# Patient Record
Sex: MALE | Race: WHITE | NOT HISPANIC OR LATINO | URBAN - METROPOLITAN AREA
[De-identification: names, ages, dates, MRNs, and addresses within clinical notes are randomized per-mention and may not be internally consistent; named-entity substitution may affect disease eponyms.]

---

## 2023-10-30 ENCOUNTER — LAB (OUTPATIENT)
Dept: LAB | Facility: LAB | Age: 43
End: 2023-10-30
Payer: COMMERCIAL

## 2023-10-30 DIAGNOSIS — J06.9 ACUTE UPPER RESPIRATORY INFECTION, UNSPECIFIED: Primary | ICD-10-CM

## 2023-10-30 DIAGNOSIS — J06.9 ACUTE UPPER RESPIRATORY INFECTION, UNSPECIFIED: ICD-10-CM

## 2023-10-30 LAB
ALBUMIN SERPL BCP-MCNC: 4.1 G/DL (ref 3.4–5)
ANION GAP SERPL CALC-SCNC: 10 MMOL/L (ref 10–20)
BASOPHILS # BLD AUTO: 0.02 X10*3/UL (ref 0–0.1)
BASOPHILS NFR BLD AUTO: 0.3 %
BUN SERPL-MCNC: 19 MG/DL (ref 6–23)
CALCIUM SERPL-MCNC: 8.9 MG/DL (ref 8.6–10.3)
CHLORIDE SERPL-SCNC: 103 MMOL/L (ref 98–107)
CO2 SERPL-SCNC: 28 MMOL/L (ref 21–32)
CREAT SERPL-MCNC: 0.98 MG/DL (ref 0.5–1.3)
EOSINOPHIL # BLD AUTO: 0.03 X10*3/UL (ref 0–0.7)
EOSINOPHIL NFR BLD AUTO: 0.4 %
ERYTHROCYTE [DISTWIDTH] IN BLOOD BY AUTOMATED COUNT: 12.8 % (ref 11.5–14.5)
GFR SERPL CREATININE-BSD FRML MDRD: >90 ML/MIN/1.73M*2
GLUCOSE SERPL-MCNC: 83 MG/DL (ref 74–99)
HCT VFR BLD AUTO: 46.4 % (ref 41–52)
HGB BLD-MCNC: 15.8 G/DL (ref 13.5–17.5)
IMM GRANULOCYTES # BLD AUTO: 0.02 X10*3/UL (ref 0–0.7)
IMM GRANULOCYTES NFR BLD AUTO: 0.3 % (ref 0–0.9)
LYMPHOCYTES # BLD AUTO: 1.6 X10*3/UL (ref 1.2–4.8)
LYMPHOCYTES NFR BLD AUTO: 21.8 %
MCH RBC QN AUTO: 30.3 PG (ref 26–34)
MCHC RBC AUTO-ENTMCNC: 34.1 G/DL (ref 32–36)
MCV RBC AUTO: 89 FL (ref 80–100)
MONOCYTES # BLD AUTO: 0.45 X10*3/UL (ref 0.1–1)
MONOCYTES NFR BLD AUTO: 6.1 %
NEUTROPHILS # BLD AUTO: 5.22 X10*3/UL (ref 1.2–7.7)
NEUTROPHILS NFR BLD AUTO: 71.1 %
NRBC BLD-RTO: 0 /100 WBCS (ref 0–0)
PHOSPHATE SERPL-MCNC: 3.4 MG/DL (ref 2.5–4.9)
PLATELET # BLD AUTO: 233 X10*3/UL (ref 150–450)
PMV BLD AUTO: 10.5 FL (ref 7.5–11.5)
POTASSIUM SERPL-SCNC: 4.2 MMOL/L (ref 3.5–5.3)
RBC # BLD AUTO: 5.22 X10*6/UL (ref 4.5–5.9)
SODIUM SERPL-SCNC: 137 MMOL/L (ref 136–145)
WBC # BLD AUTO: 7.3 X10*3/UL (ref 4.4–11.3)

## 2023-10-30 PROCEDURE — 82784 ASSAY IGA/IGD/IGG/IGM EACH: CPT

## 2023-10-30 PROCEDURE — 80069 RENAL FUNCTION PANEL: CPT

## 2023-10-30 PROCEDURE — 36415 COLL VENOUS BLD VENIPUNCTURE: CPT

## 2023-10-30 PROCEDURE — 82785 ASSAY OF IGE: CPT

## 2023-10-30 PROCEDURE — 86038 ANTINUCLEAR ANTIBODIES: CPT

## 2023-10-30 PROCEDURE — 86036 ANCA SCREEN EACH ANTIBODY: CPT

## 2023-10-30 PROCEDURE — 83516 IMMUNOASSAY NONANTIBODY: CPT

## 2023-10-30 PROCEDURE — 86317 IMMUNOASSAY INFECTIOUS AGENT: CPT

## 2023-10-30 PROCEDURE — 85025 COMPLETE CBC W/AUTO DIFF WBC: CPT

## 2023-10-31 PROBLEM — Z52.4 DONOR OF KIDNEY FOR TRANSPLANT: Status: ACTIVE | Noted: 2023-10-31

## 2023-10-31 PROBLEM — M00.9: Status: ACTIVE | Noted: 2018-05-01

## 2023-10-31 PROBLEM — L03.012 CELLULITIS OF FINGER, LEFT: Status: ACTIVE | Noted: 2018-04-30

## 2023-10-31 PROBLEM — G93.32 CHRONIC FATIGUE SYNDROME: Status: ACTIVE | Noted: 2021-07-19

## 2023-10-31 PROBLEM — F98.8 ATTENTION DEFICIT DISORDER: Status: ACTIVE | Noted: 2021-07-19

## 2023-10-31 PROBLEM — G47.31 PRIMARY CENTRAL SLEEP APNEA: Status: ACTIVE | Noted: 2021-07-19

## 2023-10-31 PROBLEM — H66.91 RIGHT OTITIS MEDIA: Status: ACTIVE | Noted: 2023-10-31

## 2023-10-31 PROBLEM — J31.0 CHRONIC RHINITIS: Status: ACTIVE | Noted: 2023-10-31

## 2023-10-31 PROBLEM — J32.0 CHRONIC MAXILLARY SINUSITIS: Status: ACTIVE | Noted: 2023-10-31

## 2023-10-31 PROBLEM — M65.9 TENOSYNOVITIS OF FINGER: Status: ACTIVE | Noted: 2018-05-01

## 2023-10-31 PROBLEM — A49.01 MSSA (METHICILLIN SUSCEPTIBLE STAPHYLOCOCCUS AUREUS) INFECTION: Status: ACTIVE | Noted: 2018-04-30

## 2023-10-31 PROBLEM — G89.29 CHRONIC PAIN: Status: ACTIVE | Noted: 2021-07-19

## 2023-10-31 LAB
ANA SER QL HEP2 SUBST: NEGATIVE
IGA SERPL-MCNC: 130 MG/DL (ref 70–400)
IGE SERPL-ACNC: 37 IU/ML (ref 0–214)
IGM SERPL-MCNC: 36 MG/DL (ref 40–230)

## 2023-10-31 RX ORDER — TRAZODONE HYDROCHLORIDE 100 MG/1
100 TABLET ORAL NIGHTLY
COMMUNITY
Start: 2023-07-21

## 2023-10-31 RX ORDER — AZELASTINE 1 MG/ML
2 SPRAY, METERED NASAL 2 TIMES DAILY
COMMUNITY
Start: 2023-10-05

## 2023-10-31 RX ORDER — LORATADINE 10 MG/1
10 TABLET ORAL DAILY
COMMUNITY
Start: 2017-02-09

## 2023-10-31 RX ORDER — FLUTICASONE PROPIONATE 50 MCG
2 SPRAY, SUSPENSION (ML) NASAL DAILY
COMMUNITY

## 2023-10-31 RX ORDER — DEXTROAMPHETAMINE SACCHARATE, AMPHETAMINE ASPARTATE MONOHYDRATE, DEXTROAMPHETAMINE SULFATE AND AMPHETAMINE SULFATE 5; 5; 5; 5 MG/1; MG/1; MG/1; MG/1
20 CAPSULE, EXTENDED RELEASE ORAL EVERY MORNING
COMMUNITY
Start: 2016-03-28

## 2023-10-31 RX ORDER — OXAPROZIN 600 MG/1
600 TABLET, FILM COATED ORAL DAILY
COMMUNITY
Start: 2023-10-22

## 2023-10-31 RX ORDER — SOD CHLOR,BICARB/SQUEEZ BOTTLE
PACKET, WITH RINSE DEVICE NASAL
COMMUNITY
Start: 2023-09-05

## 2023-10-31 RX ORDER — GABAPENTIN 400 MG/1
2 CAPSULE ORAL 4 TIMES DAILY
COMMUNITY

## 2023-10-31 RX ORDER — BUPRENORPHINE AND NALOXONE 8; 2 MG/1; MG/1
1 FILM, SOLUBLE BUCCAL; SUBLINGUAL DAILY
COMMUNITY

## 2023-10-31 RX ORDER — DULOXETIN HYDROCHLORIDE 20 MG/1
20 CAPSULE, DELAYED RELEASE ORAL 2 TIMES DAILY
COMMUNITY
Start: 2023-07-31 | End: 2023-10-29 | Stop reason: WASHOUT

## 2023-11-04 LAB
ANCA AB PATTERN SER IF-IMP: NORMAL
ANCA IGG TITR SER IF: NORMAL {TITER}
MYELOPEROXIDASE AB SER-ACNC: 0 AU/ML (ref 0–19)
PROTEINASE3 AB SER-ACNC: 0 AU/ML (ref 0–19)
S PN DA SERO 19F IGG SER-MCNC: 6.05 UG/ML
S PNEUM DA 1 IGG SER-MCNC: 1.77 UG/ML
S PNEUM DA 12F IGG SER-MCNC: 0.49 UG/ML
S PNEUM DA 14 IGG SER-MCNC: 0.16 UG/ML
S PNEUM DA 18C IGG SER-MCNC: 0.68 UG/ML
S PNEUM DA 23F IGG SER-MCNC: 0.28 UG/ML
S PNEUM DA 3 IGG SER-MCNC: 1.34 UG/ML
S PNEUM DA 4 IGG SER-MCNC: 1.25 UG/ML
S PNEUM DA 5 IGG SER-MCNC: 0.15 UG/ML
S PNEUM DA 6B IGG SER-MCNC: 0.78 UG/ML
S PNEUM DA 7F IGG SER-MCNC: 1.94 UG/ML
S PNEUM DA 8 IGG SER-MCNC: 0.77 UG/ML
S PNEUM DA 9N IGG SER-MCNC: 0.8 UG/ML
S PNEUM DA 9V IGG SER-MCNC: 1.97 UG/ML
S PNEUM SEROTYPE IGG SER-IMP: NORMAL

## 2023-11-07 ENCOUNTER — OFFICE VISIT (OUTPATIENT)
Dept: OTOLARYNGOLOGY | Facility: CLINIC | Age: 43
End: 2023-11-07
Payer: COMMERCIAL

## 2023-11-07 VITALS
TEMPERATURE: 97.2 F | SYSTOLIC BLOOD PRESSURE: 107 MMHG | RESPIRATION RATE: 16 BRPM | HEART RATE: 82 BPM | WEIGHT: 185 LBS | HEIGHT: 72 IN | DIASTOLIC BLOOD PRESSURE: 71 MMHG | BODY MASS INDEX: 25.06 KG/M2

## 2023-11-07 DIAGNOSIS — J34.89 NASAL DRAINAGE: ICD-10-CM

## 2023-11-07 DIAGNOSIS — J34.2 DEVIATED NASAL SEPTUM: ICD-10-CM

## 2023-11-07 DIAGNOSIS — J34.3 HYPERTROPHY OF BOTH INFERIOR NASAL TURBINATES: ICD-10-CM

## 2023-11-07 DIAGNOSIS — J31.0 CHRONIC RHINITIS: ICD-10-CM

## 2023-11-07 DIAGNOSIS — R76.8 LOW SERUM IGM FOR AGE: Primary | ICD-10-CM

## 2023-11-07 DIAGNOSIS — J34.89 NASAL OBSTRUCTION: ICD-10-CM

## 2023-11-07 DIAGNOSIS — R09.81 NASAL CONGESTION: ICD-10-CM

## 2023-11-07 DIAGNOSIS — J06.9 RECURRENT UPPER RESPIRATORY TRACT INFECTION: ICD-10-CM

## 2023-11-07 PROBLEM — M20.029 BOUTONNIERE DEFORMITY: Status: ACTIVE | Noted: 2018-08-10

## 2023-11-07 PROBLEM — M25.519 SHOULDER PAIN: Status: ACTIVE | Noted: 2020-01-02

## 2023-11-07 PROBLEM — Z86.59 HISTORY OF MENTAL DISORDER: Status: ACTIVE | Noted: 2023-11-07

## 2023-11-07 PROBLEM — M75.100 TEAR OF ROTATOR CUFF: Status: ACTIVE | Noted: 2023-11-07

## 2023-11-07 PROBLEM — M25.569 KNEE PAIN: Status: ACTIVE | Noted: 2023-11-07

## 2023-11-07 PROBLEM — S63.639A SPRAIN OF INTERPHALANGEAL JOINT OF FINGER: Status: ACTIVE | Noted: 2020-01-10

## 2023-11-07 PROBLEM — R22.30 LUMP ON FINGER: Status: ACTIVE | Noted: 2018-04-24

## 2023-11-07 PROCEDURE — 99213 OFFICE O/P EST LOW 20 MIN: CPT | Performed by: STUDENT IN AN ORGANIZED HEALTH CARE EDUCATION/TRAINING PROGRAM

## 2023-11-07 PROCEDURE — 1036F TOBACCO NON-USER: CPT | Performed by: STUDENT IN AN ORGANIZED HEALTH CARE EDUCATION/TRAINING PROGRAM

## 2023-11-07 NOTE — PROGRESS NOTES
Assessment  Recurrent URIs  Chronic rhinitis  Nasal congestion  Nasal drainage  Bilateral inferior turbinate hypertrophy  History of dust mite allergy     Plan   42-year-old male presenting for evaluation of recurrent URIs and associated sinonasal symptomatology.  Lab work notable for low IgM.  He was referred to allergy immunology for further evaluation.   Reports his sinonasal symptomatology is well controlled.  He will continue his current nasal hygiene/ medical regimen consisting of Flonase, azelastine (PRN), saline irrigations.    The patient was instructed on the correct technique to administer the topical nasal spray aiming at the lateral nasal wall for maximal efficacy and to avoid irritation to the septum which could lead to epistaxis. I stressed consistency of usage for maximal benefit. We discussed the rationale for the timing of this therapy and the benefits and potential adverse effects.   RTC 6m       History of Present Illness  11/7/23  The patient present for follow-up, reports improvement of his sinonasal symptomatology.  Currently has no complaints.  Lab results reviewed and discussed with patient notable for low IgM.     Recall   42-year-old male presenting for initial evaluation of multiple ENT complaints.     The patient reports developing recurrent episodes of URI/throat infections. Episodes occur once or twice a month, symptoms include throat discomfort, increased nasal drainage/congestion, occasional coughing.   Episodes usually resolve without antibiotic treatment.   Also endorses multiple sinonasal issues, usually worsened by URIs  Main sinonasal symptoms include bilateral nasal congestion, anterior and posterior nasal drainage.  Has been evaluated by allergy immunology in the past and found to have dust mite allergies.     Denies reflux symptomatology  Patient denies headaches, facial pain, facial pressure, ansomia, dental pain, mmunotherapy.   No  odynophagia/dysphagia/SOB/dyspnea/hoarseness/fevers/chills/weight loss/night sweats.     Current treatment:  Nasal Steroid: No  Sinus Rinse: Yes  Antihistamine: No  Prednisone: No  Other: None     Recent CT: None   Previous nasal/sinus surgery: None  Past Medical History:   Diagnosis Date    Other psychoactive substance dependence, in remission (CMS/HCC)     Drug addiction in remission    Pain in unspecified knee     Chronic knee pain    Personal history of other mental and behavioral disorders     History of attention deficit disorder    Unspecified rotator cuff tear or rupture of right shoulder, not specified as traumatic     Right rotator cuff tear       Past Surgical History:   Procedure Laterality Date    OTHER SURGICAL HISTORY  12/28/2016    History Of Prior Surgery         Current Outpatient Medications on File Prior to Visit   Medication Sig Dispense Refill    amphetamine-dextroamphetamine XR (Adderall XR) 20 mg 24 hr capsule Take 1 capsule (20 mg) by mouth once daily in the morning.      azelastine (Astelin) 137 mcg (0.1 %) nasal spray Administer 2 sprays into each nostril 2 times a day.      buprenorphine-naloxone (Suboxone) 8-2 mg SL film Place 1 Film under the tongue once daily.      fluticasone (Flonase) 50 mcg/actuation nasal spray Administer 2 sprays into each nostril once daily.      gabapentin (Neurontin) 400 mg capsule Take 2 capsules (800 mg) by mouth 4 times a day.      loratadine (Claritin) 10 mg tablet Take 1 tablet (10 mg) by mouth once daily.      Nasal Wash nasal rinse Administer into affected nostril(s).      oxaprozin (Daypro) 600 mg tablet Take 1 tablet (600 mg) by mouth once daily. Take with food.      traZODone (Desyrel) 100 mg tablet Take 1 tablet (100 mg) by mouth once daily at bedtime.      [DISCONTINUED] DULoxetine (Cymbalta) 20 mg DR capsule Take 1 capsule (20 mg) by mouth 2 times a day.       No current facility-administered medications on file prior to visit.        Review of  Systems  A detailed 12 point ROS was performed and is negative except as noted in the intake form, HPI and/or Past Medical History     Vitals:    11/07/23 1506   BP: 107/71   Pulse: 82   Resp: 16   Temp: 36.2 °C (97.2 °F)      Physical Exam  CONSTITUTIONAL: Vitals -reviewed from intake field, well developed, well nourished.  VOICE: Normal voice quality  RESPIRATION: Breathing comfortably, no stridor.  CV: No clubbing/cyanosis/edema in hands.  EYES: EOM Intact, sclera normal.  NEURO: Alert and oriented times 3, Cranial nerves V,VII intact and symmetric bilaterally.  HEAD AND FACE: Symmetric facial features, no masses or lesions, sinuses nontender to palpation.  SALIVARY GLANDS: Parotid and submandibular glands normal bilaterally.  EARS: Normal external ears, external auditory canals, and TMs to otoscopy  + NOSE: External nose midline, anterior rhinoscopy is normal with limited visualization to the anterior aspect of the interior turbinates. No lesions noted.  Bilateral inferior turbinate hypertrophy  Right septal deviation  ORAL CAVITY/OROPHARYNX/LIPS: Normal mucous membranes, normal floor of mouth/tongue/OP, no masses or lesions are noted.  PHARYNGEAL WALLS AND NASOPHARYNX: No masses noted. Mucosa appears clean and moist  NECK/LYMPH: No LAD, no thyroid masses. Trachea palpably midline  SKIN: Neck skin is without scar or injury  PSYCH: Alert and oriented with appropriate mood and affect